# Patient Record
Sex: MALE
[De-identification: names, ages, dates, MRNs, and addresses within clinical notes are randomized per-mention and may not be internally consistent; named-entity substitution may affect disease eponyms.]

---

## 2020-11-05 ENCOUNTER — HOSPITAL ENCOUNTER (OUTPATIENT)
Dept: HOSPITAL 5 - OR | Age: 60
Discharge: HOME | End: 2020-11-05
Attending: UROLOGY
Payer: OTHER GOVERNMENT

## 2020-11-05 VITALS — SYSTOLIC BLOOD PRESSURE: 126 MMHG | DIASTOLIC BLOOD PRESSURE: 67 MMHG

## 2020-11-05 DIAGNOSIS — Z79.899: ICD-10-CM

## 2020-11-05 DIAGNOSIS — N13.30: Primary | ICD-10-CM

## 2020-11-05 DIAGNOSIS — N21.0: ICD-10-CM

## 2020-11-05 DIAGNOSIS — Z88.8: ICD-10-CM

## 2020-11-05 DIAGNOSIS — Z87.891: ICD-10-CM

## 2020-11-05 DIAGNOSIS — Z98.890: ICD-10-CM

## 2020-11-05 PROCEDURE — 52317 REMOVE BLADDER STONE: CPT

## 2020-11-05 PROCEDURE — C1769 GUIDE WIRE: HCPCS

## 2020-11-05 PROCEDURE — 52332 CYSTOSCOPY AND TREATMENT: CPT

## 2020-11-05 PROCEDURE — A4217 STERILE WATER/SALINE, 500 ML: HCPCS

## 2020-11-05 PROCEDURE — C2617 STENT, NON-COR, TEM W/O DEL: HCPCS

## 2020-11-05 PROCEDURE — C1758 CATHETER, URETERAL: HCPCS

## 2020-11-05 PROCEDURE — 74420 UROGRAPHY RTRGR +-KUB: CPT

## 2020-11-05 NOTE — ANESTHESIA DAY OF SURGERY
Anesthesia Day of Surgery





- Day of Surgery


Patient Examined: Yes


Patient H&P Reviewed: Yes


Patient is NPO: Yes


Beta Blockers: Yes (11/4/20 PM)

## 2020-11-05 NOTE — FLUOROSCOPY REPORT
CLINICAL INDICATION:



BLADDER AND RIGHT KIDNEY STONES.





TECHNICAL DATA:



C-arm imaging was performed. Fluoroscopy time 1.03 minutes. 3 images obtained.



FINDINGS:



C-arm imaging was performed. Filling defects present right ureter secondary to a calculus on the retr
ograde pyelogram. Final images demonstrate a double pigtail stent present right ureter.



IMPRESSION:



Stent placement right ureter





Signer Name: Richie Pride MD 

Signed: 11/5/2020 6:02 PM

Workstation Name: [id number]

## 2020-11-05 NOTE — ANESTHESIA CONSULTATION
Anesthesia Consult and Med Hx


Date of service: 11/05/20





- Airway


Anesthetic Teeth Evaluation: Good


ROM Head & Neck: Adequate (pinched nerve in neck which causes parasthesias in 

b/l arms w/ moderate extension.)


Mental/Hyoid Distance: Adequate


Mallampati Class: Class II


Intubation Access Assessment: Probably Good





- Pulmonary Exam


CTA: Yes





- Cardiac Exam


Cardiac Exam: RRR





- Pre-Operative Health Status


ASA Pre-Surgery Classification: ASA3


Proposed Anesthetic Plan: General





- Pulmonary


Hx Smoking: Yes (former heavy smoker 3-4PPD. Quit >30yrs ago)


Hx Respiratory Symptoms: Yes (COVID infection 3/2020 still w/ residual 

nonproductive cough.)


SOB: No


Home Oxygen Therapy: No





- Cardiovascular System


Hx Hypertension: No


Hx Heart Attack/AMI: No


Hx Percutaneous Transluminal Coronary Angioplasty (PTCA): No


Hx Cardia Arrhythmia: No





- Central Nervous System


Hx Neuromuscular Disorder: Yes (paraplegia @ T12/L1.)


CVA: No





- Gastrointestinal


Hx Gastroesophageal Reflux Disease: No





- Endocrine


Hx Renal Disease: No (renal stones)


Hx Liver Disease: No


Hx Insulin Dependent Diabetes: No


Hx Non-Insulin Dependent Diabetes: No


Hx Thyroid Disease: No





- Other Systems


Hx Obesity: No





- Additional Comments


Anesthesia Medical History Comments: No hx anesthetic complications.

## 2020-11-05 NOTE — DISCHARGE SUMMARY
Short Stay Discharge Plan


Activity: other (no straining )


Weight Bearing Status: Non-Weight Bearing


Diet: low fat, low cholesterol, low salt


Special Instructions: other (inc fluids )


Durable Medical Equipment Needed Upon Discharge: other (has mccrary and stent )


Follow up with: 


[facility] [Primary Care Provider] - 7 Days


ZAKIA ZAIDI MD [Staff Physician] - 7 Days

## 2020-11-05 NOTE — POST OPERATIVE NOTE
Date of procedure: 11/05/20


Pre-op diagnosis: fionajankinatividad quick 


Post-op diagnosis: same (up j stone)


Findings: 





as above 


Procedure: 





cysto  laser bladder 


stent rpg 


Anesthesia: GETA


Surgeon: ZAKIA ZAIDI


Estimated blood loss: none


Pathology: list (stones)


Specimen disposition: given to patient/family


Condition: stable


Disposition: PACU

## 2020-11-05 NOTE — OPERATIVE REPORT
PREOPERATIVE DIAGNOSES:  Severe right hydronephrosis, right ureteropelvic

junction stone, bladder stone.



POSTOPERATIVE DIAGNOSES:  Severe right hydronephrosis, right ureteropelvic

junction stone, bladder stone.



PROCEDURES:  Cystoscopy, right retrograde, right double-J stent, cystolithopaxy

with laser of bladder stone and insertion of Sallis catheter.



SURGEON:  Dr. Martin.



ANESTHESIA:  General.



FINDINGS:  This is a gentleman with intermittent cath neurogenic bladder from

spinal cord injury and paraplegia.  He presents with increasing creatinine and

evidence of stones.  He has a severe obstruction on the right and bladder

stones.



DESCRIPTION OF PROCEDURE:  The patient was brought to the operating room and

placed on the operating table.  Following induction of anesthesia, placed in

lithotomy position, prepped and draped in usual sterile fashion.  Retrograde

showed the stone and we were able to get by the stone and placed a double-J

stent.  At this point, bladder stones were lasered and we evacuated all the

fragments.  The patient tolerated the procedure well and was brought to recovery

room in stable condition and will be discharged on antibiotics.  Follow up

lithotripsy nuclear renal scan.





DD: 11/05/2020 16:42

DT: 11/05/2020 16:52

JOB# [id number]  [id number]

[place]

## 2020-11-19 ENCOUNTER — HOSPITAL ENCOUNTER (OUTPATIENT)
Dept: HOSPITAL 5 - OR | Age: 60
Discharge: HOME | End: 2020-11-19
Attending: UROLOGY
Payer: OTHER GOVERNMENT

## 2020-11-19 VITALS — SYSTOLIC BLOOD PRESSURE: 133 MMHG | DIASTOLIC BLOOD PRESSURE: 63 MMHG

## 2020-11-19 DIAGNOSIS — Z98.890: ICD-10-CM

## 2020-11-19 DIAGNOSIS — E78.00: ICD-10-CM

## 2020-11-19 DIAGNOSIS — Z88.8: ICD-10-CM

## 2020-11-19 DIAGNOSIS — Z87.440: ICD-10-CM

## 2020-11-19 DIAGNOSIS — F32.9: ICD-10-CM

## 2020-11-19 DIAGNOSIS — K21.9: ICD-10-CM

## 2020-11-19 DIAGNOSIS — N20.1: Primary | ICD-10-CM

## 2020-11-19 DIAGNOSIS — I10: ICD-10-CM

## 2020-11-19 DIAGNOSIS — M19.90: ICD-10-CM

## 2020-11-19 DIAGNOSIS — Z79.899: ICD-10-CM

## 2020-11-19 DIAGNOSIS — Z87.891: ICD-10-CM

## 2020-11-19 DIAGNOSIS — E66.9: ICD-10-CM

## 2020-11-19 DIAGNOSIS — Z72.89: ICD-10-CM

## 2020-11-19 PROCEDURE — 50590 FRAGMENTING OF KIDNEY STONE: CPT

## 2020-11-19 PROCEDURE — 74018 RADEX ABDOMEN 1 VIEW: CPT

## 2020-11-19 NOTE — XRAY REPORT
ABDOMEN 1 VIEW



INDICATION / CLINICAL INFORMATION: BILATERAL KIDNEY STONES.



COMPARISON: Fluoroscopic exam from 11/5/2020.



FINDINGS:



TUBES / LINES: Partially imaged right double-J ureteral stent projects in expected alignment.

BOWEL GAS PATTERN: No significant abnormality. 

FREE AIR / EXTRALUMINAL GAS: None seen.



ADDITIONAL FINDINGS: Lower pole left renal calculi. There are 2 additional calculi projecting along t
he course of the right double-J ureteral stent, measuring up to 1 cm. Additional 5 calcification whic
h projects along the course of the right ureter is noted to project lateral to the ureter on recent u
rography study. Fixation rods in the spine without hardware complication.



IMPRESSION:



1. 2 retained stones along the proximal portion of the right ureteral stent.

2. Left nephrolithiasis. No left-sided ureteral calculus identified.







Signer Name: Christopher Hollins MD 

Signed: 11/19/2020 10:01 AM

Workstation Name: [id number]

## 2020-11-19 NOTE — DISCHARGE SUMMARY
Short Stay Discharge Plan


Activity: other (no straining )


Weight Bearing Status: Full Weight Bearing


Diet: regular


Special Instructions: other (inc fluids )


Additional Instructions: 


INCREASE ORAL FLUIDS. AVOID STRAINING.


STRAIN URINE.


CALL FOR F/U APPT.


Follow up with: 


[facility] [Primary Care Provider] - 7 Days


ZAKIA ZAIDI MD [Staff Physician] - 7 Days


Forms:  [facility]

## 2020-11-19 NOTE — ANESTHESIA CONSULTATION
Anesthesia Consult and Med Hx


Date of service: 11/19/20





- Airway


Anesthetic Teeth Evaluation: Caps, Crowns


ROM Head & Neck: Adequate


Mental/Hyoid Distance: Adequate


Mallampati Class: Class II


Intubation Access Assessment: Probably Good





- Pre-Operative Health Status


ASA Pre-Surgery Classification: ASA3


Proposed Anesthetic Plan: General





- Pulmonary


Hx Smoking: Yes (former heavy smoker 3-4PPD. Quit >30yrs ago)


Hx Asthma: No


Hx Respiratory Symptoms: Yes (COVID infection 3/2020)


SOB: No (Wheel chair bound)


COPD: No


Hx Pneumonia: No


Hx Sleep Apnea: No (ANIKA PRE SCREEN HIGH RISK)





- Cardiovascular System


Hx Hypertension: Yes


Hx Heart Attack/AMI: No


Hx Percutaneous Transluminal Coronary Angioplasty (PTCA): No


Hx Cardia Arrhythmia: No


Hx Pacemaker: No


Hx Internal Defibrillator: No


Hx Heart Murmur: No





- Central Nervous System


Hx Neuromuscular Disorder: Yes (paraplegia @ T12/L1.)


Hx Seizures: No


CVA: No


Hx Back Pain: Yes (NECK STIFFNESS. Chronic pain )


Hx Psychiatric Problems: Yes





- Gastrointestinal


Hx Gastroesophageal Reflux Disease: Yes (occasional)





- Endocrine


Hx Renal Disease: Yes (renal stones)


Hx End Stage Renal Disease: No


Hx Cirrhosis: No


Hx Liver Disease: No


Hx Insulin Dependent Diabetes: No


Hx Non-Insulin Dependent Diabetes: No


Hx Thyroid Disease: No





- Hematic


Hx Anemia: Yes


Hx Sickle Cell Disease: No





- Other Systems


Hx Alcohol Use: Yes (BEER,LIQUOR SHOT EACH NITE)


Hx Substance Use: No


Hx Cancer: No


Hx Obesity: Yes

## 2020-11-19 NOTE — POST OPERATIVE NOTE
Date of procedure: 11/19/20


Pre-op diagnosis: ureteral stone 


Post-op diagnosis: same


Findings: 





as above


Procedure: 





eswl


Anesthesia: CHANEL


Surgeon: ZAKIA ZAIDI


Estimated blood loss: none


Pathology: none


Condition: stable


Disposition: PACU

## 2020-11-19 NOTE — OPERATIVE REPORT
PREOPERATIVE DIAGNOSIS:  Right upper ureteral stone.



POSTOPERATIVE DIAGNOSIS:  Right upper ureteral stone.



PROCEDURE:  Right lithotripsy.



SURGEON:  Dr. Martin.



ANESTHESIA:  General.



FINDINGS:  This is a gentleman who came in with a severe obstruction.  Stent was

placed.  There was some debris and we just left the stent to let this calmed

down.  He has significant neurological issues with atonic bladder, now presents

for lithotripsy.



DESCRIPTION OF PROCEDURE:  The patient was brought to the operating room and

placed on the operating table.  Following induction of anesthesia, the stone was

easily localized along the stent.  Shocks were begun at 1 kV just because there

was a little bit of question whether the lower pole was in the shockwave path. 

We did a renal pause.  We went up to 9 kV.  There was some spreading out of the

stone.  The patient tolerated the procedure well.  Total of 2500 shocks were

given, brought to recovery in stable condition.





DD: 11/19/2020 11:26

DT: 11/19/2020 14:45

JOB# [id number]  [id number]

[place]

## 2021-01-18 ENCOUNTER — HOSPITAL ENCOUNTER (OUTPATIENT)
Dept: HOSPITAL 5 - NM | Age: 61
Discharge: HOME | End: 2021-01-18
Attending: UROLOGY
Payer: OTHER GOVERNMENT

## 2021-01-18 DIAGNOSIS — N13.2: Primary | ICD-10-CM

## 2021-01-18 PROCEDURE — 96374 THER/PROPH/DIAG INJ IV PUSH: CPT

## 2021-01-18 PROCEDURE — 74018 RADEX ABDOMEN 1 VIEW: CPT

## 2021-01-18 PROCEDURE — A9562 TC99M MERTIATIDE: HCPCS

## 2021-01-18 PROCEDURE — 78708 K FLOW/FUNCT IMAGE W/DRUG: CPT

## 2021-01-18 NOTE — XRAY REPORT
ABDOMEN 1 VIEW



INDICATION / CLINICAL INFORMATION:

HYDRONEPHROSIS W/RENAL AND URETERAL CALCULOUS.



COMPARISON: 

11/19/2020.



FINDINGS:



TUBES / LINES: Right-sided JJ ureteral stent in stable position.

BOWEL GAS PATTERN: No significant abnormality. 

FREE AIR / EXTRALUMINAL GAS: None seen.



ADDITIONAL FINDINGS: 1.2 cm left renal calculus stable since prior exam. There has been significant d
ecrease in size of the previously noted calcified stones along the ureteral stent in the proximal ure
ter (previously 7 and 8 mm respectively) now both measuring approximately 3 mm. There is now a 7 mm s
tone in the right pelvicalyceal system. Spinal hardware is stable.



IMPRESSION:

1. Right JJ ureteral stent in stable position.

2. Bilateral calcified stone burden as described above. 



Signer Name: Mukesh Zhou MD 

Signed: 1/18/2021 11:03 AM

Workstation Name: [id number]

## 2021-01-18 NOTE — NUCLEAR MEDICINE REPORT
KIDNEY IMAGING MORPHOLOGY WITH VASCULAR FLOW AND FUNCTION, SINGLE STUDY WITH LASIX



HISTORY: Hydronephrosis with renal and ureteral calculus



COMPARISON: KUB dated 11/19/2020



TECHNIQUE:  Following IV administration of Tc-99m-MAG3, sequential dynamic images of the kidneys were
 obtained in the posterior projection for 30 minutes. Following IV administration of Lasix, additiona
l images were acquired for 20 minutes.  Time activity whole kidney curves were analyzed.



RADIOPHARMACEUTICAL: 5 mCi of Tc-99m-MAG3

MEDICATION:

Lasix 20 mg iv at 21 minutes.



FINDINGS:

There is homogeneous distribution of the radiopharmaceutical within the renal cortex of each kidney. 
The right kidney appears slightly smaller than the left kidney with delayed uptake and delayed excret
ion of the radiotracer compared to the left side.



DIFFERENTIAL FUNCTION:

Right: 23%

Left: 77%



RIGHT:

Time-to-peak activity: 25 minutes, prolonged

T1/2: Out of range minutes, prolonged

Ureter: No significantly abnormal activity.  Normal caliber.

Post-Lasix imaging: There is a blunted excretory response to Lasix.



LEFT:

Time-to-peak activity: 4 minutes, normal

T1/2: 33 minutes, normal

Ureter: No significantly abnormal activity.  Normal caliber.

Post-Lasix imaging: No significant abnormality in urinary excretion.

 

Additional Findings: None.

 

IMPRESSION:

Split function is 77% left kidney and 23% right kidney.

Delayed uptake and delayed excretion of the radiotracer on the right side is noted with a blunted res
ponse to Lasix administration.



Signer Name: Uziel Garcia Jr, MD 

Signed: 1/18/2021 10:15 AM

Workstation Name: [id number]

## 2021-02-15 ENCOUNTER — HOSPITAL ENCOUNTER (OUTPATIENT)
Dept: HOSPITAL 5 - OR | Age: 61
Discharge: HOME | End: 2021-02-15
Attending: UROLOGY
Payer: OTHER GOVERNMENT

## 2021-02-15 VITALS — DIASTOLIC BLOOD PRESSURE: 78 MMHG | SYSTOLIC BLOOD PRESSURE: 131 MMHG

## 2021-02-15 DIAGNOSIS — I10: ICD-10-CM

## 2021-02-15 DIAGNOSIS — E78.00: ICD-10-CM

## 2021-02-15 DIAGNOSIS — E66.9: ICD-10-CM

## 2021-02-15 DIAGNOSIS — M19.90: ICD-10-CM

## 2021-02-15 DIAGNOSIS — Z87.440: ICD-10-CM

## 2021-02-15 DIAGNOSIS — Z72.89: ICD-10-CM

## 2021-02-15 DIAGNOSIS — Z79.899: ICD-10-CM

## 2021-02-15 DIAGNOSIS — Z98.890: ICD-10-CM

## 2021-02-15 DIAGNOSIS — K21.9: ICD-10-CM

## 2021-02-15 DIAGNOSIS — Z87.891: ICD-10-CM

## 2021-02-15 DIAGNOSIS — F32.9: ICD-10-CM

## 2021-02-15 DIAGNOSIS — Z88.8: ICD-10-CM

## 2021-02-15 DIAGNOSIS — N13.2: Primary | ICD-10-CM

## 2021-02-15 LAB
BUN SERPL-MCNC: 15 MG/DL (ref 9–20)
BUN/CREAT SERPL: 21 %
CALCIUM SERPL-MCNC: 9.2 MG/DL (ref 8.4–10.2)
HCT VFR BLD CALC: 38 % (ref 35.5–45.6)
HEMOLYSIS INDEX: 7
HGB BLD-MCNC: 13 GM/DL (ref 11.8–15.2)
MCHC RBC AUTO-ENTMCNC: 34 % (ref 32–34)
MCV RBC AUTO: 93 FL (ref 84–94)
PLATELET # BLD: 248 K/MM3 (ref 140–440)
RBC # BLD AUTO: 4.1 M/MM3 (ref 3.65–5.03)

## 2021-02-15 PROCEDURE — 80048 BASIC METABOLIC PNL TOTAL CA: CPT

## 2021-02-15 PROCEDURE — C1769 GUIDE WIRE: HCPCS

## 2021-02-15 PROCEDURE — 52356 CYSTO/URETERO W/LITHOTRIPSY: CPT

## 2021-02-15 PROCEDURE — 74420 UROGRAPHY RTRGR +-KUB: CPT

## 2021-02-15 PROCEDURE — 36415 COLL VENOUS BLD VENIPUNCTURE: CPT

## 2021-02-15 PROCEDURE — 85027 COMPLETE CBC AUTOMATED: CPT

## 2021-02-15 PROCEDURE — C2617 STENT, NON-COR, TEM W/O DEL: HCPCS

## 2021-02-15 PROCEDURE — C1758 CATHETER, URETERAL: HCPCS
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
## 2021-02-15 NOTE — OPERATIVE REPORT
PREOPERATIVE DIAGNOSES:  Right ureteral stone, chronic hydronephrosis, poorly

functioning right kidney, poorly compliant with retained stent.  



PREOPERATIVE DIAGNOSES:  Right ureteral stone, chronic hydronephrosis, poorly

functioning right kidney, poorly compliant with retained stent with a calcified

stent.  



PROCEDURES:  Cystoscopy, laser of stones and laser of stent, attempted to

removal of stent retrograde with reinsertion of a second stent alongside.  



SURGEON:  Dr. Martin. 



ANESTHESIA:  General. 



FINDINGS:  This is a gentleman who had a stone.  We had blasted.  It looked

great.  He had a stent in the kidney.  He was supposed to come back 2 months

ago.  He never came back.  He had a nuclear scan showed a 22% function

hydronephrosis on the right.  He now presents for treatment.  



DESCRIPTION OF PROCEDURE:  The patient was brought to the operating room and

placed on the operating table.  Following induction of anesthesia, placed in

lithotomy position, prepped and draped in usual sterile fashion.  Stent was seen

using the 500 micron fiber.  The stent was freed of the surrounding

calcification.  We tried to pull it out in the urethra, but it would not uncoil

in the kidney level.  It looks like there may be some coating around the stent,

would not allow it to uncoil.  We tried wire alongside it and a stent alongside

it, which was an open-ended, it would still not uncoil.  The plan will be

placing another stent either percutaneously to get the stent out or we will try

to go from below after this sits for a while 1-2 weeks and do lithotripsy and

then try to uncoil the stent to remove both of them.  He has 22% function.  I

will explain it to him.  We placed a Rodgers, so that we would be sure he would

follow up because he is not very compliant.  He was brought to recovery in

stable condition.  No significant bleeding.  No complications.





DD: 02/15/2021 11:36

DT: 02/15/2021 11:54

JOB# [id number]  [id number]

[place]

## 2021-02-15 NOTE — DISCHARGE SUMMARY
Short Stay Discharge Plan


Activity: other (no straining )


Weight Bearing Status: Full Weight Bearing


Diet: low fat, low cholesterol, low salt


Additional Instructions: 


INCREASE ORAL FLUIDS.


Follow up with: 


[facility] [Primary Care Provider] - 7 Days


ZAKIA ZAIDI MD [Staff Physician] - 7 Days


Forms:  [facility]

## 2021-02-15 NOTE — POST OPERATIVE NOTE
Date of procedure: 02/15/21


Pre-op diagnosis: stones retained stent 


Post-op diagnosis: same


Findings: 





calcified stent 


Procedure: 





cystio laser rpg ureteroscpy 


j stent 


Anesthesia: DEANAA


Surgeon: ZAKIA ZAIDI


Pathology: list (stones)


Specimen disposition: given to patient/family


Condition: stable

## 2021-02-15 NOTE — ANESTHESIA DAY OF SURGERY
Anesthesia Day of Surgery





- Day of Surgery


Patient Examined: Yes


Patient H&P Reviewed: Yes


Patient is NPO: Yes


Beta Blockers: Yes (took Metoprolol yesterday)

## 2021-02-18 ENCOUNTER — HOSPITAL ENCOUNTER (OUTPATIENT)
Dept: HOSPITAL 5 - OR | Age: 61
Discharge: HOME | End: 2021-02-18
Attending: UROLOGY
Payer: OTHER GOVERNMENT

## 2021-02-18 VITALS — SYSTOLIC BLOOD PRESSURE: 136 MMHG | DIASTOLIC BLOOD PRESSURE: 77 MMHG

## 2021-02-18 DIAGNOSIS — Z98.890: ICD-10-CM

## 2021-02-18 DIAGNOSIS — Z87.891: ICD-10-CM
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
## 2021-02-18 DIAGNOSIS — E66.9: ICD-10-CM
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
## 2021-02-18 DIAGNOSIS — F32.9: ICD-10-CM

## 2021-02-18 DIAGNOSIS — E78.00: ICD-10-CM

## 2021-02-18 DIAGNOSIS — N13.2: Primary | ICD-10-CM

## 2021-02-18 DIAGNOSIS — Z88.8: ICD-10-CM

## 2021-02-18 DIAGNOSIS — D64.9: ICD-10-CM

## 2021-02-18 DIAGNOSIS — Z72.89: ICD-10-CM

## 2021-02-18 DIAGNOSIS — Z79.899: ICD-10-CM

## 2021-02-18 DIAGNOSIS — M19.90: ICD-10-CM

## 2021-02-18 DIAGNOSIS — T83.192A: ICD-10-CM

## 2021-02-18 DIAGNOSIS — K21.9: ICD-10-CM

## 2021-02-18 DIAGNOSIS — I10: ICD-10-CM

## 2021-02-18 PROCEDURE — C2617 STENT, NON-COR, TEM W/O DEL: HCPCS

## 2021-02-18 PROCEDURE — C1769 GUIDE WIRE: HCPCS

## 2021-02-18 PROCEDURE — C1758 CATHETER, URETERAL: HCPCS

## 2021-02-18 PROCEDURE — 50590 FRAGMENTING OF KIDNEY STONE: CPT

## 2021-02-18 PROCEDURE — 74018 RADEX ABDOMEN 1 VIEW: CPT

## 2021-02-18 PROCEDURE — 52332 CYSTOSCOPY AND TREATMENT: CPT

## 2021-02-18 PROCEDURE — A4217 STERILE WATER/SALINE, 500 ML: HCPCS

## 2021-02-18 PROCEDURE — 52351 CYSTOURETERO & OR PYELOSCOPE: CPT

## 2021-02-18 NOTE — ANESTHESIA CONSULTATION
Anesthesia Consult and Med Hx


Date of service: 02/18/21





- Airway


Anesthetic Teeth Evaluation: Good


ROM Head & Neck: Adequate


Mental/Hyoid Distance: Adequate


Mallampati Class: Class II


Intubation Access Assessment: Probably Good





- Pulmonary Exam


CTA: Yes





- Cardiac Exam


Cardiac Exam: RRR





- Pre-Operative Health Status


ASA Pre-Surgery Classification: ASA3


Proposed Anesthetic Plan: General





- Pulmonary


Hx Smoking: Yes (former heavy smoker 3-4PPD. Quit >30yrs ago)


Hx Sleep Apnea: No (ANIKA PRE SCREEN HIGH RISK)





- Cardiovascular System


Hx Hypertension: Yes


Hx Heart Attack/AMI: No





- Central Nervous System


Hx Neuromuscular Disorder: Yes (paraplegia @ T12/L1.)


CVA: No


Hx Back Pain: Yes (NECK STIFFNESS. Chronic pain )


Hx Psychiatric Problems: Yes





- Gastrointestinal


Hx Gastroesophageal Reflux Disease: Yes (occasional)





- Endocrine


Hx Renal Disease: Yes (CKD)


Hx Liver Disease: No


Hx Insulin Dependent Diabetes: No


Hx Non-Insulin Dependent Diabetes: No


Hx Thyroid Disease: No





- Hematic


Hx Anemia: Yes





- Additional Comments


Anesthesia Medical History Comments: Had cystoscopy at [facility] under [place] on 2/15/21 

without complications.

## 2021-02-18 NOTE — SHORT STAY SUMMARY
Short Stay Documentation


Date of service: 02/18/21





- History


H&P: obtained from office





- Allergies and Medications


Current Medications: 


                                    Allergies





ceftriaxone [From Rocephin] Allergy (Verified 02/15/21 08:37)


   Rash


oxycodone [From Percocet] Adverse Reaction (Verified 02/15/21 08:37)


   HAS FLU-LIKE SYMPTOMS, NAUSEA





                                Home Medications











 Medication  Instructions  Recorded  Confirmed  Last Taken  Type


 


AtorvaSTATin [Lipitor] 40 mg PO QHS 11/05/20 02/17/21 02/17/21 History


 


C/Sourcherry/Celery/Grape Seed 1 each PO QDAY 11/05/20 02/17/21 02/17/21 History





[Tart Cherry Capsule]     


 


Calcium Carbonate/Vitamin D3 1 each PO QDAY 11/05/20 02/17/21 02/17/21 History





[Calcium 600-Vit D3 400 Tablet]     


 


Gabapentin 600 mg PO BID 11/05/20 02/17/21 02/17/21 History


 


Glucosam/Chondr/Collagn/Hyalur 1 each PO QDAY 11/05/20 02/17/21 02/17/21 History





[Glucosamine & Chondroitin Cap]     


 


Metoprolol [Lopressor] 12.5 mg PO QHS 11/05/20 02/17/21 02/17/21 History


 


Naproxen [Naprosyn] 500 mg PO BID 11/05/20 02/18/21 01/25/21 08:00 History


 


Oxybutynin [Ditropan] 5 mg PO TID 11/05/20 02/17/21 02/17/21 History


 


traMADoL [Ultram] 50 mg PO PRN PRN 11/05/20 02/17/21 02/17/21 History


 


Tylenol 1 tab PO PRN PRN 11/12/20 02/17/21 02/17/21 History


 


Gabapentin [Neurontin] 900 mg PO QHS 02/09/21 02/17/21 02/17/21 History


 


DULoxetine [Cymbalta] 20 mg PO QHS 02/10/21 02/17/21 02/17/21 History








Active Medications





Lactated Ringer's (Lactated Ringers)  1,000 mls @ 100 mls/hr IV AS DIRECT ALDA


   Stop: 02/18/21 23:59


   Last Admin: 02/18/21 12:20 Dose:  100 mls/hr


   Documented by: 


Midazolam HCl (Midazolam 2 Mg/2 Ml Inj)  2 mg IV PREOP NR


   Stop: 02/18/21 23:59


   Last Admin: 02/18/21 14:03 Dose:  2 mg


   Documented by: 











- Brief post op/procedure progress note


Date of procedure: 02/18/21


Pre-op diagnosis: retained stent with stone--right


Post-op diagnosis: same


Procedure: 





rt ESWL, cysto, ureteroscopy, stent exchange


Anesthesia: GETA


Surgeon: DESTIN MG


Pathology: none


Condition: stable





- Hospital course


Hospital course: 











macrobid on chart, naproxen at home





- Disposition


Condition at discharge: Stable


Disposition: DC-01 TO HOME OR SELFCARE





Short Stay Discharge Plan


Follow up with: 


[facility] [Primary Care Provider] - 7 Days

## 2021-02-18 NOTE — OPERATIVE REPORT
PREOPERATIVE DIAGNOSIS:  Right retained stent with encrustation.



POSTOPERATIVE DIAGNOSIS:  Right retained stent with encrustation.



PROCEDURE:  Extracorporal shock wave lithotripsy of stent and stone, right

ureteroscopy, stent exchange with external string (6-Kazakh 24).



SURGEON:  Surinder Brito MD



ANESTHESIA:  General.



ESTIMATED BLOOD LOSS:  Minimal.



FLUIDS:  Crystalloid.



COMPLICATIONS:  No complications.



INDICATIONS:  This patient is a 60-year-old gentleman, patient of Dr. Martin,

who had a stone in 11/2020, underwent stent lithotripsy.  He was lost to

followup.  Earlier this month, attempts to remove the stent were unsuccessful

due to encrustation.  He does have 2 stents now due to that manipulation

recently.  He presents now for lithotripsy of the stent and removal.  He is

paraplegic.



DESCRIPTION OF THE PROCEDURE:  The patient was taken to the operative suite and

after adequate general anesthesia, placed in the supine position.  The lower

stent, which is the retained stent for several months, the J was identified. 

Extracorporal shock wave lithotripsy at 7 kV at 2500 shocks.  Shocks were

distributed around the J, 5-minute renal pause after 200 shocks.  At that point,

the patient was then taken to the cystoscopy suite, placed in a dorsal lithotomy

position, prepped and draped in a sterile fashion.  Cystourethroscopy was

performed.  The new stent was pulled out to the meatus.  A 0.035 Glidewire was

placed under fluoroscopic guidance for control.  Multiple attempts to remove, it

stent that was still somewhat encrusted.  Rigid ureteroscopy up to the proximal

ureter could see some calcification still on the stent; however, now it began to

have some movement.  The ureteroscope was removed.  I pulled on the stent again

and it came free.  Still some small stone fragments on the stent.  A 6-Kazakh 24

cm double-J stent with an external string was left indwelling.  Rectal exam was

benign, removed some of the stones, which were sent for analysis.  Rectal exam

was benign.  He was extubated and taken to recovery room in stable condition. 

His wife states he takes naproxen for pain and he will have some Macrobid.





DD: 02/18/2021 16:13

DT: 02/18/2021 16:29

JOB# [id number]  [id number]

[facility]

## 2021-02-18 NOTE — XRAY REPORT
XR abdomen 1V ap



INDICATION / CLINICAL INFORMATION:

CYSTO, STENT EXCHANGE.



COMPARISON:

None available.



FINDINGS:

Right ureteral stent exchange





Fluoroscopy time: 11 seconds. Fluoroscopic images: 3.



IMPRESSION:

1. Right ureteral stent exchange



Signer Name: Arya Pereyra MD FACR 

Signed: 2/18/2021 4:08 PM

Workstation Name: [id number]

## 2021-11-10 ENCOUNTER — HOSPITAL ENCOUNTER (OUTPATIENT)
Dept: HOSPITAL 5 - OR | Age: 61
Discharge: HOME | End: 2021-11-10
Attending: UROLOGY
Payer: OTHER GOVERNMENT

## 2021-11-10 VITALS — SYSTOLIC BLOOD PRESSURE: 117 MMHG | DIASTOLIC BLOOD PRESSURE: 72 MMHG

## 2021-11-10 DIAGNOSIS — Z98.890: ICD-10-CM

## 2021-11-10 DIAGNOSIS — M19.90: ICD-10-CM

## 2021-11-10 DIAGNOSIS — Z87.440: ICD-10-CM

## 2021-11-10 DIAGNOSIS — G89.29: ICD-10-CM

## 2021-11-10 DIAGNOSIS — R82.81: ICD-10-CM

## 2021-11-10 DIAGNOSIS — Z88.8: ICD-10-CM

## 2021-11-10 DIAGNOSIS — Z87.891: ICD-10-CM

## 2021-11-10 DIAGNOSIS — Z79.899: ICD-10-CM

## 2021-11-10 DIAGNOSIS — I10: ICD-10-CM

## 2021-11-10 DIAGNOSIS — N13.2: Primary | ICD-10-CM

## 2021-11-10 PROCEDURE — C2617 STENT, NON-COR, TEM W/O DEL: HCPCS

## 2021-11-10 PROCEDURE — C1726 CATH, BAL DIL, NON-VASCULAR: HCPCS

## 2021-11-10 PROCEDURE — 52332 CYSTOSCOPY AND TREATMENT: CPT

## 2021-11-10 PROCEDURE — C1769 GUIDE WIRE: HCPCS

## 2021-11-10 PROCEDURE — 74430 CONTRAST X-RAY BLADDER: CPT

## 2021-11-10 PROCEDURE — C1758 CATHETER, URETERAL: HCPCS

## 2021-11-10 NOTE — POST OPERATIVE NOTE
Pre-op diagnosis: r ureteral stone 


Post-op diagnosis: same


Findings: 





as above


Procedure: 





cysto rpg stent ureteroscopy


Anesthesia: GETA


Surgeon: ZAKIA ZAIDI


Estimated blood loss: none


Condition: stable


Disposition: PACU

## 2021-11-10 NOTE — DISCHARGE SUMMARY
Short Stay Discharge Plan


Activity: other (no straining )


Weight Bearing Status: Full Weight Bearing


Diet: low fat


Durable Medical Equipment Needed Upon Discharge: other (has j stent !!)


Follow up with: 


[facility] [Primary Care Provider] - 7 Days

## 2021-11-10 NOTE — ANESTHESIA CONSULTATION
Anesthesia Consult and Med Hx


Date of service: 11/10/21





- Airway


Anesthetic Teeth Evaluation: Good


ROM Head & Neck: Adequate


Mental/Hyoid Distance: Adequate


Mallampati Class: Class II


Intubation Access Assessment: Probably Good (previous LMA 4 and 5)





- Pre-Operative Health Status


ASA Pre-Surgery Classification: ASA3


Proposed Anesthetic Plan: General





- Pulmonary


Hx Smoking: Yes (former heavy smoker 3-4PPD. Quit >30yrs ago)


Hx Respiratory Symptoms: No


Hx Sleep Apnea: No (ANIKA PRE SCREEN HIGH RISK)





- Cardiovascular System


Hx Hypertension: Yes (took metoprolol last night)


Hx Heart Attack/AMI: No


Hx Percutaneous Transluminal Coronary Angioplasty (PTCA): No


Hx Cardia Arrhythmia: No





- Central Nervous System


Hx Neuromuscular Disorder: Yes (spinal injury @ T12/L1.)


CVA: No


Hx Back Pain: Yes (chronic back pain)





- Endocrine


Hx Renal Disease: No


Hx Liver Disease: No


Hx Insulin Dependent Diabetes: No


Hx Non-Insulin Dependent Diabetes: No


Hx Thyroid Disease: No





- Other Systems


Hx Obesity: No





- Additional Comments


Anesthesia Medical History Comments: Has had multiple urology procedures under 

[place] at this facility without anesthetic complications.

## 2021-12-01 LAB
ALBUMIN SERPL-MCNC: 4.1 G/DL (ref 3.9–5)
ALT SERPL-CCNC: 38 UNITS/L (ref 7–56)
BUN SERPL-MCNC: 14 MG/DL (ref 9–20)
BUN/CREAT SERPL: 16 %
CALCIUM SERPL-MCNC: 9.3 MG/DL (ref 8.4–10.2)
HCT VFR BLD CALC: 40.4 % (ref 35.5–45.6)
HEMOLYSIS INDEX: 9
HGB BLD-MCNC: 13.4 GM/DL (ref 11.8–15.2)
MCHC RBC AUTO-ENTMCNC: 33 % (ref 32–34)
MCV RBC AUTO: 94 FL (ref 84–94)
PLATELET # BLD: 239 K/MM3 (ref 140–440)
RBC # BLD AUTO: 4.3 M/MM3 (ref 3.65–5.03)

## 2021-12-06 ENCOUNTER — HOSPITAL ENCOUNTER (OUTPATIENT)
Dept: HOSPITAL 5 - OR | Age: 61
Discharge: HOME | End: 2021-12-06
Attending: UROLOGY
Payer: OTHER GOVERNMENT

## 2021-12-06 VITALS — SYSTOLIC BLOOD PRESSURE: 124 MMHG | DIASTOLIC BLOOD PRESSURE: 45 MMHG

## 2021-12-06 DIAGNOSIS — K21.9: ICD-10-CM

## 2021-12-06 DIAGNOSIS — Z79.899: ICD-10-CM

## 2021-12-06 DIAGNOSIS — Z20.822: ICD-10-CM

## 2021-12-06 DIAGNOSIS — Z88.1: ICD-10-CM

## 2021-12-06 DIAGNOSIS — F32.9: ICD-10-CM

## 2021-12-06 DIAGNOSIS — Z88.8: ICD-10-CM

## 2021-12-06 DIAGNOSIS — Z98.890: ICD-10-CM

## 2021-12-06 DIAGNOSIS — M19.90: ICD-10-CM

## 2021-12-06 DIAGNOSIS — N13.2: Primary | ICD-10-CM

## 2021-12-06 DIAGNOSIS — E78.00: ICD-10-CM

## 2021-12-06 DIAGNOSIS — Z87.891: ICD-10-CM

## 2021-12-06 PROCEDURE — C1758 CATHETER, URETERAL: HCPCS

## 2021-12-06 PROCEDURE — C2617 STENT, NON-COR, TEM W/O DEL: HCPCS

## 2021-12-06 PROCEDURE — 52356 CYSTO/URETERO W/LITHOTRIPSY: CPT

## 2021-12-06 PROCEDURE — U0003 INFECTIOUS AGENT DETECTION BY NUCLEIC ACID (DNA OR RNA); SEVERE ACUTE RESPIRATORY SYNDROME CORONAVIRUS 2 (SARS-COV-2) (CORONAVIRUS DISEASE [COVID-19]), AMPLIFIED PROBE TECHNIQUE, MAKING USE OF HIGH THROUGHPUT TECHNOLOGIES AS DESCRIBED BY CMS-2020-01-R: HCPCS

## 2021-12-06 PROCEDURE — C1769 GUIDE WIRE: HCPCS

## 2021-12-06 PROCEDURE — 74420 UROGRAPHY RTRGR +-KUB: CPT

## 2021-12-06 PROCEDURE — 80053 COMPREHEN METABOLIC PANEL: CPT

## 2021-12-06 PROCEDURE — 85027 COMPLETE CBC AUTOMATED: CPT

## 2021-12-06 PROCEDURE — C1726 CATH, BAL DIL, NON-VASCULAR: HCPCS

## 2021-12-06 PROCEDURE — 36415 COLL VENOUS BLD VENIPUNCTURE: CPT

## 2021-12-06 NOTE — DISCHARGE SUMMARY
Short Stay Discharge Plan


Activity: other (no straining )


Weight Bearing Status: Partial Weight Bearing


Diet: regular, low fat


Durable Medical Equipment Needed Upon Discharge: other (home with j stent and 

mccrary )


Follow up with: 


[facility] [Primary Care Provider] - 7 Days

## 2021-12-06 NOTE — FLUOROSCOPY REPORT
INTRAOPERATIVE FLUOROSCOPY



INDICATION / CLINICAL INFORMATION:

RT RENAL STONE. 



TECHNIQUE:

Intraoperative spot images were obtained during the procedure.



FINDINGS:

Intraoperative fluoroscopy images 



See operative/procedure note by performing physician for full details.



Fluoroscopy Time: 2 min and 2 seconds. Fluoroscopy Images: 5.



Signer Name: Tony Funes MD 

Signed: 12/6/2021 1:20 PM

Workstation Name: [id number]

## 2021-12-06 NOTE — POST OPERATIVE NOTE
Date of procedure: 12/06/21


Pre-op diagnosis: r upj and stones 


Post-op diagnosis: same


Findings: 





upj and debris and gravel


Procedure: 





r ureteroscopy laserr ulises


Anesthesia: GETA


Surgeon: ZAKIA ZAIDI


Estimated blood loss: none


Pathology: none


Condition: stable


Disposition: PACU

## 2021-12-06 NOTE — ANESTHESIA CONSULTATION
Anesthesia Consult and Med Hx


Date of service: 12/06/21





- Airway


Anesthetic Teeth Evaluation: Good


ROM Head & Neck: Adequate


Mental/Hyoid Distance: Adequate


Mallampati Class: Class II


Intubation Access Assessment: Probably Good (previous LMAs 4/5)





- Pre-Operative Health Status


ASA Pre-Surgery Classification: ASA3


Proposed Anesthetic Plan: General





- Pulmonary


Hx Smoking: Yes (former heavy smoker 3-4PPD. Quit >30yrs ago)


Hx Respiratory Symptoms: No


Hx Sleep Apnea: No (ANIKA PRE SCREEN HIGH RISK)





- Cardiovascular System


Hx Hypertension: Yes


Hx Heart Attack/AMI: No


Hx Percutaneous Transluminal Coronary Angioplasty (PTCA): No





- Central Nervous System


Hx Neuromuscular Disorder: Yes (spinal injury @ T12/L1.)


CVA: No


Hx Back Pain: Yes (chronic)


Hx Psychiatric Problems: Yes





- Gastrointestinal


Hx Gastroesophageal Reflux Disease: Yes (occasional)





- Endocrine


Hx Renal Disease: No


Hx Liver Disease: No


Hx Insulin Dependent Diabetes: No


Hx Non-Insulin Dependent Diabetes: No


Hx Thyroid Disease: No





- Other Systems


Hx Obesity: No





- Additional Comments


Anesthesia Medical History Comments: No hx anesthetic complications.

## 2021-12-06 NOTE — OPERATIVE REPORT
DATE OF SURGERY: 12/06/2021



PREOPERATIVE DIAGNOSES:  Right UPJ narrowing, urethral stricture disease, 

neurogenic bladder and recurrent stones.



POSTOPERATIVE DIAGNOSES:  Right UPJ narrowing, urethral stricture disease, 

neurogenic bladder and recurrent stones.



PROCEDURE:  Cystoscopy, right ureteroscopy, laser of stone and a double-J stent 

exchange.



SURGEON:  Shaun Matrin MD



ANESTHESIA:  General.



FINDINGS:  This is a gentleman with a double-J stent, large stone.  Last time, 

he had lots of debris in the kidney.  We did not feel comfortable inflating the 

kidney.  A double-J was left.  He now presents for second staged procedure.



DESCRIPTION OF PROCEDURE:  The patient was brought to the operating room and 

placed on the operating table.  Following induction of anesthesia, placed in 

lithotomy position, prepped and draped in usual sterile fashion.  

Cystourethroscopy showed a very tight penile urethral from catheterization, 

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  Prostatic urethra was normal.  Bladder 

neck was open.  The double-J was withdrawn and a wire coiled up in the kidney.  

Flexible ureteroscopy showed some debris and small stones, which were lasered.  

The patient tolerated the procedure well.  A 7-Slovak double-J was placed and we

dilated the urethra to 20-Slovak and placed an 18 Councill.  The patient 

tolerated the procedure well and brought to recovery room in stable condition.







DD: 12/06/2021 11:11 AM

DT: 12/06/2021 11:33 AM

TID: [id number] RECEIPT:

ANTONIETTA/STARR/ZURDO

## 2022-01-20 ENCOUNTER — HOSPITAL ENCOUNTER (OUTPATIENT)
Dept: HOSPITAL 5 - OR | Age: 62
Discharge: HOME | End: 2022-01-20
Attending: UROLOGY
Payer: OTHER GOVERNMENT

## 2022-01-20 VITALS — DIASTOLIC BLOOD PRESSURE: 64 MMHG | SYSTOLIC BLOOD PRESSURE: 111 MMHG

## 2022-01-20 DIAGNOSIS — Z87.440: ICD-10-CM

## 2022-01-20 DIAGNOSIS — K21.9: ICD-10-CM

## 2022-01-20 DIAGNOSIS — Z20.822: ICD-10-CM

## 2022-01-20 DIAGNOSIS — Z79.899: ICD-10-CM

## 2022-01-20 DIAGNOSIS — Z88.8: ICD-10-CM

## 2022-01-20 DIAGNOSIS — N13.2: Primary | ICD-10-CM

## 2022-01-20 DIAGNOSIS — I10: ICD-10-CM

## 2022-01-20 DIAGNOSIS — Z87.891: ICD-10-CM

## 2022-01-20 DIAGNOSIS — M19.90: ICD-10-CM

## 2022-01-20 DIAGNOSIS — Z98.890: ICD-10-CM

## 2022-01-20 PROCEDURE — 50590 FRAGMENTING OF KIDNEY STONE: CPT

## 2022-01-20 PROCEDURE — U0003 INFECTIOUS AGENT DETECTION BY NUCLEIC ACID (DNA OR RNA); SEVERE ACUTE RESPIRATORY SYNDROME CORONAVIRUS 2 (SARS-COV-2) (CORONAVIRUS DISEASE [COVID-19]), AMPLIFIED PROBE TECHNIQUE, MAKING USE OF HIGH THROUGHPUT TECHNOLOGIES AS DESCRIBED BY CMS-2020-01-R: HCPCS

## 2022-01-20 NOTE — POST OPERATIVE NOTE
Date of procedure: 01/20/22


Pre-op diagnosis: r stone 


Post-op diagnosis: same


Findings: 





same 


Procedure: 





r eswl 


Anesthesia: CHANEL


Surgeon: ZAKIA ZAIDI


Estimated blood loss: none


Pathology: none


Condition: stable


Disposition: PACU

## 2022-01-20 NOTE — DISCHARGE SUMMARY
Short Stay Discharge Plan


Activity: other (no srtraining )


Weight Bearing Status: Full Weight Bearing


Diet: regular, low fat, low cholesterol, low salt


Special Instructions: other (inc fluids )


Follow up with: 


[facility] [Primary Care Provider] - 7 Days


ZAKIA ZAIDI MD [Staff Physician] - 10 Days

## 2022-01-20 NOTE — OPERATIVE REPORT
DATE OF SURGERY: 01/20/2022



PREOPERATIVE DIAGNOSES:  Right transposed ureteral stone, right kidney stone 

with double-J stents.



POSTOPERATIVE DIAGNOSES:  Right transposed ureteral stone, right kidney stone 

with double-J stents.



PROCEDURES:  Right lithotripsy.



SURGEON:  Dr. Martin.



ANESTHESIA:  General.



FINDINGS:  This is a gentleman with a 6-7 mm stone now in the lower pole of the 

right kidney and a smaller one in the mid pole with the double J.  He now 

presents for treatment.



DESCRIPTION OF PROCEDURE:  The patient was brought to the operating table.  

Following induction of anesthesia, stone was easily localized both the AP and 

oblique image.  Shocks were begun at 1 kV, increased to maximum of 7 kV.  Total 

of 2500 shocks were given to the stone.  The patient tolerated the procedure 

well.  Excellent fragmentation was achieved, brought to recovery in stable 

condition.  He knows he has a stent and he has to get it out within the next 10 

days.







DD: 01/20/2022 09:41 AM

DT: 01/20/2022 09:53 AM

TID: [id number] RECEIPT: [id number]

ANTONIETTA/MARIE

## 2022-01-20 NOTE — ANESTHESIA CONSULTATION
Anesthesia Consult and Med Hx


Date of service: 01/20/22





- Airway


Anesthetic Teeth Evaluation: Good


ROM Head & Neck: Adequate


Mental/Hyoid Distance: Adequate


Mallampati Class: Class II


Intubation Access Assessment: Probably Good





- Pre-Operative Health Status


ASA Pre-Surgery Classification: ASA3


Proposed Anesthetic Plan: General





- Pulmonary


Hx Smoking: Yes (former heavy smoker 3-4PPD. Quit >30yrs ago)


Hx Asthma: No


Hx Respiratory Symptoms: No


COPD: No


Hx Pneumonia: No


Hx Sleep Apnea: No (ANIKA PRE SCREEN HIGH RISK)





- Cardiovascular System


Hx Hypertension: Yes


Hx Heart Attack/AMI: No


Hx Percutaneous Transluminal Coronary Angioplasty (PTCA): No


Hx Cardia Arrhythmia: No


Hx Pacemaker: No


Hx Internal Defibrillator: No


Hx Heart Murmur: No





- Central Nervous System


Hx Neuromuscular Disorder: Yes (spinal injury @ T12/L1.)


Hx Seizures: No


CVA: No


Hx Back Pain: Yes (CHRONIC NECK AND BACK PAIN)


Hx Psychiatric Problems: Yes





- Gastrointestinal


Hx Gastroesophageal Reflux Disease: Yes (occasional)





- Endocrine


Hx Renal Disease: No


Hx End Stage Renal Disease: No


Hx Cirrhosis: No


Hx Liver Disease: No


Hx Insulin Dependent Diabetes: No


Hx Non-Insulin Dependent Diabetes: No


Hx Thyroid Disease: No





- Hematic


Hx Anemia: Yes


Hx Sickle Cell Disease: No





- Other Systems


Hx Alcohol Use: Yes (BEER,LIQUOR SHOT EACH NIGHT)


Hx Substance Use: No


Hx Cancer: No


Hx Obesity: No